# Patient Record
Sex: MALE | Race: BLACK OR AFRICAN AMERICAN | NOT HISPANIC OR LATINO | Employment: OTHER | URBAN - METROPOLITAN AREA
[De-identification: names, ages, dates, MRNs, and addresses within clinical notes are randomized per-mention and may not be internally consistent; named-entity substitution may affect disease eponyms.]

---

## 2018-03-21 PROBLEM — I10 ESSENTIAL HYPERTENSION: Status: ACTIVE | Noted: 2018-03-21

## 2018-03-21 PROBLEM — S81.801A WOUND OF RIGHT LEG, INITIAL ENCOUNTER: Status: ACTIVE | Noted: 2018-03-21

## 2018-03-21 PROBLEM — J32.9 CHRONIC SINUSITIS: Status: ACTIVE | Noted: 2018-03-21

## 2018-03-21 PROBLEM — E11.9 DIABETES MELLITUS, TYPE 2: Status: ACTIVE | Noted: 2018-03-21

## 2018-03-21 PROBLEM — M06.9 RHEUMATOID ARTHRITIS: Status: ACTIVE | Noted: 2018-03-21

## 2018-03-21 PROBLEM — T68.XXXA HYPOTHERMIA: Status: ACTIVE | Noted: 2018-03-21

## 2018-03-21 PROBLEM — D64.9 ANEMIA: Status: ACTIVE | Noted: 2018-03-21

## 2018-03-21 PROBLEM — E78.5 DYSLIPIDEMIA: Status: ACTIVE | Noted: 2018-03-21

## 2018-03-21 PROBLEM — L89.219 DECUBITUS ULCER OF RIGHT HIP: Status: ACTIVE | Noted: 2018-03-21

## 2018-03-21 PROBLEM — I50.32 CHRONIC DIASTOLIC HEART FAILURE: Status: ACTIVE | Noted: 2018-03-21

## 2018-03-21 PROBLEM — I96 GANGRENE OF TOE OF LEFT FOOT: Status: ACTIVE | Noted: 2018-03-21

## 2018-03-21 PROBLEM — S81.802A WOUND OF LEFT LEG, INITIAL ENCOUNTER: Status: ACTIVE | Noted: 2018-03-21

## 2018-03-21 PROBLEM — M62.82 RHABDOMYOLYSIS: Status: ACTIVE | Noted: 2018-03-21

## 2018-03-21 PROBLEM — K27.9 PEPTIC ULCER: Status: ACTIVE | Noted: 2018-03-21
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
## 2018-03-21 PROBLEM — I27.20 PULMONARY HYPERTENSION: Status: ACTIVE | Noted: 2018-03-21

## 2018-03-21 PROBLEM — I63.9 CVA (CEREBRAL VASCULAR ACCIDENT): Status: ACTIVE | Noted: 2018-03-21

## 2018-03-21 PROBLEM — E78.5 HYPERLIPIDEMIA: Status: ACTIVE | Noted: 2018-03-21

## 2018-03-21 PROBLEM — R13.10 DYSPHAGIA: Status: ACTIVE | Noted: 2018-03-21

## 2018-03-21 PROBLEM — J15.9 BACTERIAL PNEUMONIA: Status: ACTIVE | Noted: 2018-03-21

## 2018-03-21 PROBLEM — N40.0 BPH (BENIGN PROSTATIC HYPERPLASIA): Status: ACTIVE | Noted: 2018-03-21

## 2018-05-09 DIAGNOSIS — I63.9 IMPENDING CEREBROVASCULAR ACCIDENT: ICD-10-CM

## 2018-05-09 DIAGNOSIS — R13.12 OROPHARYNGEAL DYSPHAGIA: Primary | ICD-10-CM

## 2018-05-15 ENCOUNTER — HOSPITAL ENCOUNTER (OUTPATIENT)
Dept: WOUND CARE | Facility: HOSPITAL | Age: 83
Discharge: HOME | End: 2018-05-15
Attending: SURGERY
Payer: MEDICARE

## 2018-05-15 VITALS — HEART RATE: 75 BPM | SYSTOLIC BLOOD PRESSURE: 117 MMHG | TEMPERATURE: 97 F | DIASTOLIC BLOOD PRESSURE: 64 MMHG

## 2018-05-15 DIAGNOSIS — L89.214 DECUBITUS ULCER OF RIGHT HIP, STAGE 4: Primary | ICD-10-CM

## 2018-05-15 PROCEDURE — 11042 PR DEBRIDEMENT, SKIN, SUB-Q TISSUE,=<20 SQ CM: ICD-10-PCS | Mod: ,,, | Performed by: SURGERY

## 2018-05-15 PROCEDURE — 11042 DBRDMT SUBQ TIS 1ST 20SQCM/<: CPT | Mod: ,,, | Performed by: SURGERY

## 2018-05-15 PROCEDURE — 11042 DBRDMT SUBQ TIS 1ST 20SQCM/<: CPT

## 2018-05-15 PROCEDURE — 99214 OFFICE O/P EST MOD 30 MIN: CPT

## 2018-05-15 RX ORDER — ZINC SULFATE 50(220)MG
220 CAPSULE ORAL DAILY
COMMUNITY

## 2018-05-15 RX ORDER — LORATADINE 10 MG/1
10 TABLET ORAL DAILY
COMMUNITY

## 2018-05-15 RX ORDER — TESTOSTERONE CYPIONATE 200 MG/ML
200 INJECTION, SOLUTION INTRAMUSCULAR
COMMUNITY

## 2018-05-15 RX ORDER — ACETAMINOPHEN 325 MG/1
325 TABLET ORAL EVERY 4 HOURS PRN
COMMUNITY

## 2018-05-15 RX ORDER — MULTIVITAMIN
1 TABLET ORAL DAILY
COMMUNITY

## 2018-05-15 NOTE — PROGRESS NOTES
Much of the documentation for this visit was completed in the [employer] system. Please see the attached documentation for further details about this patient's care.     Shadia Andrade RN

## 2018-05-22 ENCOUNTER — CLINICAL SUPPORT (OUTPATIENT)
Dept: SPEECH THERAPY | Facility: HOSPITAL | Age: 83
End: 2018-05-22
Attending: INTERNAL MEDICINE
Payer: MEDICARE

## 2018-05-22 ENCOUNTER — HOSPITAL ENCOUNTER (OUTPATIENT)
Dept: WOUND CARE | Facility: HOSPITAL | Age: 83
Discharge: HOME | End: 2018-05-22
Attending: SURGERY
Payer: MEDICARE

## 2018-05-22 ENCOUNTER — HOSPITAL ENCOUNTER (OUTPATIENT)
Dept: RADIOLOGY | Facility: HOSPITAL | Age: 83
Discharge: HOME | End: 2018-05-22
Attending: INTERNAL MEDICINE
Payer: MEDICARE

## 2018-05-22 VITALS — SYSTOLIC BLOOD PRESSURE: 131 MMHG | DIASTOLIC BLOOD PRESSURE: 66 MMHG | TEMPERATURE: 98 F | HEART RATE: 86 BPM

## 2018-05-22 DIAGNOSIS — I63.9 IMPENDING CEREBROVASCULAR ACCIDENT: ICD-10-CM

## 2018-05-22 DIAGNOSIS — R13.12 OROPHARYNGEAL DYSPHAGIA: ICD-10-CM

## 2018-05-22 PROCEDURE — G8998 SWALLOW D/C STATUS: HCPCS | Mod: CJ

## 2018-05-22 PROCEDURE — 27201912 HC WOUND CARE DEBRIDEMENT SUPPLIES

## 2018-05-22 PROCEDURE — G8997 SWALLOW GOAL STATUS: HCPCS | Mod: CJ

## 2018-05-22 PROCEDURE — 97535 SELF CARE MNGMENT TRAINING: CPT

## 2018-05-22 PROCEDURE — 74230 X-RAY XM SWLNG FUNCJ C+: CPT | Mod: TC

## 2018-05-22 PROCEDURE — 11042 DBRDMT SUBQ TIS 1ST 20SQCM/<: CPT

## 2018-05-22 PROCEDURE — G8996 SWALLOW CURRENT STATUS: HCPCS | Mod: CJ

## 2018-05-22 PROCEDURE — 11042 PR DEBRIDEMENT, SKIN, SUB-Q TISSUE,=<20 SQ CM: ICD-10-PCS | Mod: RT,,, | Performed by: SURGERY

## 2018-05-22 PROCEDURE — 11042 DBRDMT SUBQ TIS 1ST 20SQCM/<: CPT | Mod: RT,,, | Performed by: SURGERY

## 2018-05-22 PROCEDURE — 74230 X-RAY XM SWLNG FUNCJ C+: CPT | Mod: 26,,, | Performed by: RADIOLOGY

## 2018-05-22 PROCEDURE — 92611 MOTION FLUOROSCOPY/SWALLOW: CPT

## 2018-05-22 NOTE — PROGRESS NOTES
TIME RECORD    Date: 05/22/2018    Start Time:  950  Stop Time:  1010  Self-Care/education: 10 mins    PROCEDURES:    TIMED  Procedure Time Min.    Start:  Stop:     Start:  Stop:     Start:  Stop:     Start:  Stop:          UNTIMED  Procedure Time Min.    Start: 950  Stop: 1010  20 mins    Start:  Stop:      Total Timed Minutes:  20 mins   Total Timed Units:  0  Total Untimed Units:  1  Charges Billed/# of units:  1      REHAB SERVICE VIDEO SWALLOW STUDY    HICN:  [id number]  Onset Date:  5/22/2018  SOC Date:  5/22/2018  Certification Period:  5/22/2018 to 8/22/2018  Primary Diagnosis:  Dysphagia   Treatment Diagnosis:  Dysphagia   Secondary Diagnosis:  N/A  Pertinent Medical History:    Past Medical History:   Diagnosis Date    Anemia     Angina pectoris, unspecified     Arthritis, rheumatoid     Candidiasis     CHF (congestive heart failure)     Constipation     Diabetes mellitus     Elevated prostate specific antigen (PSA)     Hyperlipidemia     Hypertension     Muscle weakness (generalized)     Peptic ulcer     Pressure ulcer of hip     right; stage 4    Pressure ulcer of other site, stage 2     Protein-calorie malnutrition     Pulmonary nodule     Sinusitis, chronic      Prior Therapy Dates/Results (same condition):  Pt currently receiving ST services at [facility], since recent discharge from [facility] 4/20/18.   Pt participated in MBSS while in [facility] on 3/22/18, results are as follows:   Oral Preparation/Oral Phase  Pt demonstrates moderate oral dysphagia c/b:  · Poor bolus formation and control-- for dental soft and hard solid textures  · prolonged A-P transfer-- for dental soft and hard solid textures  · prolonged mastication-- for dental soft and hard solid textures  · Oral residue present post swallow-- mild across all consistencies   · Decreased base of tongue mobility- across all consistencies, more so for solid textures  · Premature spillage-- for thin liquids      Pharyngeal  "Phase   - Pt presents with mild-moderate pharyngeal dysphagia c/b fair laryngeal elevation/excursion, a mild-moderate delay in trigger of swallow-- swallow triggered at pyriform sinuses across all consistencies.   -Pt demonstrated slightly reduced epiglottic inversion c/b flash penetration (above the level of the vocal cords) across all trials of thin liquids. However, pt demonstrated adequate airway protection and good true vocal cord closure, as pt was able to eject penetrated materials from airway.   -Pt demonstrated reduced tongue base retraction c/b reduced pharyngeal sensation and clearance, as mild residuals were observed in vallecula or pyriform sinuses s/p swallow across all consistencies assessed, with residuals increasing for hard solid textures. SLP instructed pt to perform double swallow technique to clear residuals. Pt was successful in clearing pharyngeal residuals with double swallow technique (2-3 swallows).     -Pt demonstrated only flash penetration with thin liquids and no aspiration under fluoro across all other consistencies.     Prior Level of Function:  Pt is wheelchair bound and was brought to MBSS today via transport from [facility].   Functional Deficits Leading to Referral:  Pt referred by MD Perez for MBSS 2/2 primary treating SLP at [facility] recommending pt to participated in MBSS "to radiographically assess swallowig function" per physician's orders found in pt's EMR. Pt is a poor historian and was unable to recall primary treating SLP's name or what exercises he participates in during skilled ST sessions. However, pt stated "I talk a lot [during session] because I speak to low."   Nutrition:  Per EMR, since most recent MBSS, SLP recs: mechanical soft/thin liquids po diet  Signs of Abuse:  No  Medication:    Alertness/Attention:  Pt was awake, alert and able to sustain and maintain attention throughout MBSS.  Oral Motor:  WFL, pt edentulous with lower partials " observed   Patient Position:  Sitting  View:  Lateral       Consistencies Assessed  THIN: 5cc x1, 10cc x1, 15ccx1 of thin liquids barium via cup, 15cc x1 of thin liquid barium via straw  PUREE: tsp amount of barium pudding x1  DENTAL SOFT: tsp amount of diced peaches (coated in barium powder) x1    Oral Phase:   - Pt continues to present with moderate oral dysphagia c/b reduced oral motor coordination, reduced tongue strength and mobility, which resulted in premature spillage and mild pooling of materials in the pharyngeal cavity across all consistencies, as well as prolonged AP transfer and prolonged mastication for dental soft textures. Pt also demonstrated reduced oral clearance, as mild residuals were observed throughout pt's oral cavity across all consistencies, which was cleared with instruction from SLP to perform double swallow technique     Pharyngeal Phase:    -Pt presents with mild pharyngeal dysphagia c/b fair laryngeal elevation/excursion and a mild delay in trigger of swallow-- swallow triggered at level of vallecula across all consistencies, which demonstrates a slight improvement since recent MBSS (3/22/18)    - Pt also demonstrated adequate epiglottic inversion and air way protection, as pt was observed with no penetration and/or aspiration under fluoro across all consistencies during the swallow, which is also an improvement, as pt was observed with consistent flash penetration of thin liquids during the swallow during last MBSS (3/22/18).       -However, pt continues to demonstrate reduced tongue base retraction c/b reduced pharyngeal sensation and clearance, as mild residuals were observed in vallecula or pyriform sinuses s/p swallow across all consistencies assessed, with residuals increasing for puree and dental soft textures. SLP instructed pt to perform double swallow technique to clear residuals from pharyngeal cavity. Pt was successful in clearing pharyngeal residuals with multiple swallow  technique (2-3 swallows). Trace residuals were observed to trickle along pt's airway entrance s/p swallow, which puts pt at risk for aspiration after the swallow, if pharyngeal residuals are not cleared with multiple swallows technique.   -Overall, pt demonstrated no penetration and/or aspiration under fluoro across all consistencies assessed.        Strategies/Compensatory Techniques Utilized:    -SLP instructed pt to perform multiple swallow technique (2-3 swallows) per sip/bite to clear oral and/or pharyngeal residuals. Pt was successful in clearing pharyngeal residuals with multiple swallow technique (2-3 swallows).      Impressions:    -Pt continues to present with moderate oral dysphagia c/b reduced oral motor coordination, reduced tongue strength and mobility, resulting in premature spillage and mild pooling of materials in the pharyngeal cavity across all consistencies, as well as prolonged AP transfer and prolonged mastication for dental soft textures. Pt also demonstrated reduced oral clearance, as mild residuals were observed throughout pt's oral cavity across all consistencies.     -Pt also presents with mild pharyngeal dysphagia, which is a slight improvement since recent MBSS in March of 2018. Pt demonstrates fair laryngeal elevation/excursion and a mild delay in trigger of swallow-- swallow triggered at level of vallecula across all consistencies, and adequate epiglottic inversion and air way protection, as pt was observed with no penetration and/or aspiration under fluoro across all consistencies during the swallow, which is  an improvement, as pt was observed with consistent flash penetration of thin liquids during the swallow during last MBSS (3/22/18).  However, pt continues to demonstrate reduced tongue base retraction c/b reduced pharyngeal sensation and clearance, as mild residuals were observed in vallecula or pyriform sinuses s/p swallow across all consistencies assessed, with residuals  increasing for puree and dental soft textures, which can put pt at risk for aspiration after the swallow, if pharyngeal residuals are not cleared with multiple swallows technique.       Recommendations/Precautions:    -SLP recs: Pt should continue mechanical soft textures/thin liquids po diet with multiple swallows technique (2-3 swallows) per sip/bite, along with all other universal swallow precautions (upright at 90 degrees, alternate sips/bites, 1 bite/sip at a time, remain upright for 30 mins after meals, whole meds 1 by 1, etc.).  -Pt would also benefit from continue skilled ST services to strengthen oral and pharyngeal musculature to improve pt's swallowing mechanism.     Plan:    -SLP will send MBSS report to MD Gaurav Perez, via EPIC  -SLP will also send MBSS report to primary treating SLP     LYNN Avila, CF-SLP  Speech-Language Pathologist   5/22/2018    I CERTIFY THE NEED FOR THESE SERVICES FURNISHED UNDER THIS PLAN OF TREATMENT AND WHILE UNDER MY CARE    Physician's comments: ________________________________________________________________________________________________________________________________________________      Physician's Name: ___________________________________

## 2018-05-29 ENCOUNTER — HOSPITAL ENCOUNTER (OUTPATIENT)
Dept: WOUND CARE | Facility: HOSPITAL | Age: 83
Discharge: HOME | End: 2018-05-29
Attending: SURGERY
Payer: MEDICARE

## 2018-05-29 VITALS — SYSTOLIC BLOOD PRESSURE: 150 MMHG | HEART RATE: 80 BPM | DIASTOLIC BLOOD PRESSURE: 60 MMHG | TEMPERATURE: 98 F

## 2018-05-29 DIAGNOSIS — L89.214 DECUBITUS ULCER OF RIGHT HIP, STAGE 4: Primary | ICD-10-CM

## 2018-05-29 PROCEDURE — 27201912 HC WOUND CARE DEBRIDEMENT SUPPLIES

## 2018-05-29 PROCEDURE — 11042 DBRDMT SUBQ TIS 1ST 20SQCM/<: CPT | Mod: RT,,, | Performed by: SURGERY

## 2018-05-29 PROCEDURE — 87075 CULTR BACTERIA EXCEPT BLOOD: CPT

## 2018-05-29 PROCEDURE — 11042 PR DEBRIDEMENT, SKIN, SUB-Q TISSUE,=<20 SQ CM: ICD-10-PCS | Mod: RT,,, | Performed by: SURGERY

## 2018-05-29 PROCEDURE — 11042 DBRDMT SUBQ TIS 1ST 20SQCM/<: CPT

## 2018-05-29 PROCEDURE — 87070 CULTURE OTHR SPECIMN AEROBIC: CPT

## 2018-05-29 PROCEDURE — 87077 CULTURE AEROBIC IDENTIFY: CPT

## 2018-05-29 PROCEDURE — 87186 SC STD MICRODIL/AGAR DIL: CPT

## 2018-06-03 LAB
BACTERIA SPEC AEROBE CULT: NORMAL
BACTERIA SPEC AEROBE CULT: NORMAL

## 2018-06-04 LAB — BACTERIA SPEC ANAEROBE CULT: NORMAL

## 2018-06-04 RX ORDER — GENTAMICIN SULFATE 1 MG/G
OINTMENT TOPICAL
Qty: 15 G | Refills: 1 | Status: SHIPPED | OUTPATIENT
Start: 2018-06-04

## 2018-06-04 RX ORDER — SULFAMETHOXAZOLE AND TRIMETHOPRIM 400; 80 MG/1; MG/1
1 TABLET ORAL 2 TIMES DAILY
Qty: 14 TABLET | Refills: 0 | Status: SHIPPED | OUTPATIENT
Start: 2018-06-04 | End: 2018-06-11